# Patient Record
Sex: FEMALE | Race: WHITE | NOT HISPANIC OR LATINO | Employment: PART TIME | URBAN - METROPOLITAN AREA
[De-identification: names, ages, dates, MRNs, and addresses within clinical notes are randomized per-mention and may not be internally consistent; named-entity substitution may affect disease eponyms.]

---

## 2025-04-28 ENCOUNTER — APPOINTMENT (OUTPATIENT)
Dept: PRIMARY CARE | Facility: CLINIC | Age: 40
End: 2025-04-28

## 2025-04-28 VITALS
OXYGEN SATURATION: 98 % | DIASTOLIC BLOOD PRESSURE: 70 MMHG | BODY MASS INDEX: 23.7 KG/M2 | HEART RATE: 94 BPM | TEMPERATURE: 97.8 F | SYSTOLIC BLOOD PRESSURE: 112 MMHG | HEIGHT: 67 IN | WEIGHT: 151 LBS

## 2025-04-28 DIAGNOSIS — Z00.00 ROUTINE ADULT HEALTH MAINTENANCE: Primary | ICD-10-CM

## 2025-04-28 PROCEDURE — 1036F TOBACCO NON-USER: CPT | Performed by: FAMILY MEDICINE

## 2025-04-28 PROCEDURE — 3008F BODY MASS INDEX DOCD: CPT | Performed by: FAMILY MEDICINE

## 2025-04-28 PROCEDURE — 99396 PREV VISIT EST AGE 40-64: CPT | Performed by: FAMILY MEDICINE

## 2025-04-28 RX ORDER — MULTIVITAMIN
1 TABLET ORAL
COMMUNITY

## 2025-04-28 NOTE — PROGRESS NOTES
Subjective   Patient ID: Neelam Schaeffer is a 39 y.o. female who presents for New Patient Visit and Establish Care.  HPI  Here for CPE.  No complaints.  Here to establish care.    Shx: , drinks occasionally.  Rarely has a cigarette.  .  Has a daughter named Nina.  Came from [facility] when she was a teenager.    Family History[1]  No cancer hx.  Hx of diabetes in family.    Problem List[2]    Social Connections: Not on file       Medications Ordered Prior to Encounter[3]     Vitals:    04/28/25 0924   BP: 112/70   Pulse: 94   Temp: 36.6 °C (97.8 °F)   SpO2: 98%     Vitals:    04/28/25 0924   Weight: 68.5 kg (151 lb)       Review of Systems   All other systems reviewed and are negative.      Objective     Physical Exam  Vitals reviewed.   Constitutional:       General: She is not in acute distress.     Appearance: Normal appearance. She is well-developed. She is not diaphoretic.   HENT:      Head: Normocephalic and atraumatic.      Right Ear: Tympanic membrane normal.      Left Ear: Tympanic membrane normal.      Nose: Nose normal.      Mouth/Throat:      Mouth: Mucous membranes are moist.   Eyes:      Pupils: Pupils are equal, round, and reactive to light.   Cardiovascular:      Rate and Rhythm: Normal rate and regular rhythm.      Heart sounds: Normal heart sounds. No murmur heard.     No friction rub. No gallop.   Pulmonary:      Effort: Pulmonary effort is normal.      Breath sounds: Normal breath sounds. No rales.   Abdominal:      General: Bowel sounds are normal.      Palpations: Abdomen is soft.      Tenderness: There is no abdominal tenderness.   Musculoskeletal:      Cervical back: Normal range of motion and neck supple.   Skin:     General: Skin is warm and dry.   Neurological:      Mental Status: She is alert.   Psychiatric:         Mood and Affect: Mood normal.         No visits with results within 2 Month(s) from this visit.   Latest known visit with results is:   [facility] Encounter on  08/16/2022   Component Date Value Ref Range Status    Ferritin 08/16/2022 96  8 - 150 ug/L Final    Vitamin D, 25-Hydroxy 08/16/2022 53  ng/mL Final    Comment: .  DEFICIENCY:         < 20   NG/ML  INSUFFICIENCY:      20-29  NG/ML  SUFFICIENCY:         NG/ML    THIS ASSAY ACCURATELY QUANTIFIES THE SUM OF  VITAMIN D3, 25-HYDROXY AND VIT D2,25-HYDROXY.      Vitamin B-12 08/16/2022 758  211 - 911 pg/mL Final       Assessment/Plan   Problem List Items Addressed This Visit    None  Visit Diagnoses         Codes      Routine adult health maintenance    -  Primary Z00.00    Relevant Orders    Comprehensive Metabolic Panel    Lipid Panel    CBC and Auto Differential          Checking BW.  Pt is doing well.  Fu as needed.       [1]   Family History  Problem Relation Name Age of Onset    Diabetes Maternal Grandmother     [2]   Patient Active Problem List  Diagnosis    Allergic rhinitis    Asthma   [3]   Current Outpatient Medications on File Prior to Visit   Medication Sig Dispense Refill    multivitamin tablet Take 1 tablet by mouth once daily.       No current facility-administered medications on file prior to visit.

## 2025-07-23 DIAGNOSIS — Z12.31 ENCOUNTER FOR SCREENING MAMMOGRAM FOR BREAST CANCER: ICD-10-CM
